# Patient Record
Sex: MALE | Race: WHITE | URBAN - METROPOLITAN AREA
[De-identification: names, ages, dates, MRNs, and addresses within clinical notes are randomized per-mention and may not be internally consistent; named-entity substitution may affect disease eponyms.]

---

## 2024-08-06 ENCOUNTER — SSC ENCOUNTER (OUTPATIENT)
Dept: ADMINISTRATIVE | Facility: OTHER | Age: 79
End: 2024-08-06

## 2024-08-08 ENCOUNTER — OUTPATIENT CASE MANAGEMENT (OUTPATIENT)
Dept: ADMINISTRATIVE | Facility: OTHER | Age: 79
End: 2024-08-08

## 2024-08-30 ENCOUNTER — OUTPATIENT CASE MANAGEMENT (OUTPATIENT)
Dept: ADMINISTRATIVE | Facility: OTHER | Age: 79
End: 2024-08-30

## 2024-08-30 NOTE — LETTER
08/30/2024         Aldair Peña Jr   [address] [place] [address]       Dear Aldair Peña Jr,     I work with [facility]'s Outpatient  Complex Care Management Department. We received a referral to call you to discuss your medical history. These services are free of charge and are offered to [facility] patients who have recently been discharged from any of our facilities or who have medical conditions that may require the skill of a nurse to assist with management.     I am a Registered Nurse who specializes in connecting patients with available medical and financial resources as well as addressing any educational needs that may be indicated.    I attempted to reach you by telephone, but I was unsuccessful. Please call our department so that we can go over some questions with you, regarding your health.    The Outpatient Care Management Department can be reached at [phone], from 8:00AM to 4:30 PM, on Monday thru Friday.           Thanks,        Valentina Berg, RN Case Manager  Outpatient Complex Care Management  [facility]  Business: [phone]   [email or web address]

## 2024-08-30 NOTE — PROGRESS NOTES
8/30/2024    2nd attempt to complete Initial Assessment  for Outpatient Care Management, left message.  Will also send via [facility] [employer] portal pt  -  unable to assess letter.

## 2024-09-03 ENCOUNTER — OUTPATIENT CASE MANAGEMENT (OUTPATIENT)
Dept: ADMINISTRATIVE | Facility: OTHER | Age: 79
End: 2024-09-03

## 2024-09-03 NOTE — PROGRESS NOTES
9/3/2024  OPCM outreach call, 3rd attempt to complete Initial Assessment  for Outpatient Care Management. Pt's wife is requesting a call back due to the Mr. Peña taking a nap. Will attempt tomorrow, 9/4/24.

## 2024-09-03 NOTE — NURSING
I offered to discuss advanced care planning, including how to pick a person who would make decisions for you if you were unable to make them for yourself, called a health care power of . Nargis, the patient's wife states it is already on file in Epic. Verified as of 1/6/2020, the document has been in place. No updates requested.

## 2024-09-04 ENCOUNTER — OUTPATIENT CASE MANAGEMENT (OUTPATIENT)
Dept: ADMINISTRATIVE | Facility: OTHER | Age: 79
End: 2024-09-04

## 2024-09-06 NOTE — PROGRESS NOTES
Outpatient Care Management  Initial Patient Assessment    Patient: Aldair Peña Jr  MRN: [id number]  Date of Service: 09/04/2024  Completed by: Valentina Berg  Referral Date:  8/6/24  Date of Eligibility: 8/6/2024  Program: High Risk  Status: Ongoing  Effective Dates: 9/4/2024 - present  Responsible Staff: Valentina Berg        Reason for Visit   Patient presents with    OPCM Enrollment Call    Nursing Assessment       Brief Summary:    Aldair Peña Jr was referred by High Risk list [facility] pt on Estela for OPCM. Patient qualifies for program based on Estela Risk Score of 1.977 and OCH of 40.9.   Active problem list, medical, surgical and social history reviewed.   Active comorbidities include:Pure hypercholesterolemia, Bladder neck obstruction,Meniere's disease, H/O prostate biopsy, Constipation, Non-recurrent unilateral inguinal hernia without obstruction or gangrene, History of bladder cancer, Paraspinal mass,Plasma cell disorder, Dementia without behavioral disturbance, Hypercalcemia of malignancy, Hematuria, Iron deficiency anemia, Tremor, Acute cystitis without hematuria, other dysphagia, Bladder cancer , MGUS (monoclonal gammopathy of unknown significance), Postoperative confusion, S/P prostatectomy, Benign prostatic hyperplasia without lower urinary tract symptoms, Hematuria  Areas of need identified by patient included chronic pain and nocturia.  Next steps:   Mr. Peña agreed to OPCM RN follow up on or around 9/11/24.  Send Mr. Peña written education about chronic pain and alternatives for pain relief such as heat/ice, position changes, guided imagery, aromatherapy etc..  Send letter to MD notifying of enrollment and pt request to create a plan for pain management.   Aldair is agreeable to reading the written education via portal that will be reviewed next follow up.  He will start to create a plan with his wife about managing his pain at home without  medication.    Disability Status  Is the patient alert and oriented (person, place, time, and situation)?: Alert and oriented x 4  Hearing Difficulty or Deaf: yes  Hearing Management: other (hear aids)  Visual Difficulty or Blind: yes (corrective lenses)  Visual and Hearing Needs Conclusion: The pt uses medical devices for correction of vision and hearing bilat. Aldair baxter had eye exam 3 months ago, hearing aids work fine too.  Vision Management: Other (glassess)  Difficulty Concentrating, Remembering or Making Decisions: yes  Concentration Management: medication  Communication Difficulty: no  Eating/Swallowing Difficulty: no  Walking or Climbing Stairs Difficulty: no  Dressing/Bathing Difficulty: no  Toileting : Independent  Continence : Incontience - Bladder (at night)  Difficulty Managing Errands Independently: yes  Equipment Currently Used at Home: none  ADL Conclusion Statement: Mr. Powers is very active and completes his own ADL's. His wife Nargis handles moset of th eIADL's.  Change in Functional Status Since Onset of Current Illness/Injury: yes (dementia sometimes)        Spiritual Beliefs  Spiritual, Cultural Beliefs, Buddhism Practices, Values that Affect Care: no  Description of Beliefs that Will Affect Care: none         Social Determinants of Health     Financial Resource Strain: Low Risk  (9/4/2024)    Overall Financial Resource Strain (CARDIA)     Difficulty of Paying Living Expenses: Not hard at all   Food Insecurity: No Food Insecurity (9/4/2024)    Hunger Vital Sign     Worried About Running Out of Food in the Last Year: Never true     Ran Out of Food in the Last Year: Never true   Transportation Needs: No Transportation Needs (9/4/2024)    TRANSPORTATION NEEDS     Transportation : No   Physical Activity: Sufficiently Active (9/4/2024)    Exercise Vital Sign     Days of Exercise per Week: 5 days     Minutes of Exercise per Session: 50 min   Stress: Stress Concern Present (9/4/2024)    [employer]  [place] [employer] - Occupational Stress Questionnaire     Feeling of Stress : To some extent   Housing Stability: Low Risk  (9/4/2024)    Housing Stability Vital Sign     Unable to Pay for Housing in the Last Year: No     Homeless in the Last Year: No       Roles and Relationships  Primary Source of Support/Comfort: spouse (Nargis)  Name of Support/Comfort Primary Source: Nargis  Primary Roles/Responsibilities: retired; disabled  Secondary Source of Support/Comfort: sibling(s)  Name of Support/Comfort Secondary Source: Karley      Advance Directives (For Healthcare)  Advance Directive  (If Adv Dir status is received, view document under Adv Dir in header or Chart Review Media tab): Advance Directive currently in Epic.        Patient Reported Insurance  Verified current insurance plan:: Medicare            9/4/2024     3:48 PM   Depression Patient Health Questionnaire   Over the last two weeks how often have you been bothered by little interest or pleasure in doing things Not at all   Over the last two weeks how often have you been bothered by feeling down, depressed or hopeless Not at all   PHQ-2 Total Score 0       Learning Assessment       09/04/2024 1549 [facility] (9/4/2024 - Present)   Created by Valentina Berg -  Status: Complete                 PRIMARY LEARNER     Primary Learner Name:  Mr. Peña AA - 09/04/2024 1549    Relationship:  Patient AA - 09/04/2024 1549    Does the primary learner have any barriers to learning?:  Visual, Hearing, Cognitive AA - 09/04/2024 1549    What is the preferred language of the primary learner?:  English AA - 09/04/2024 1549    Is an  required?:  No AA - 09/04/2024 1549    How does the primary learner prefer to learn new concepts?:  Listening, Reading AA - 09/04/2024 1549    How often do you need to have someone help you read instructions, pamphlets, or written material from your doctor or pharmacy?:  Rarely  -  09/04/2024 1549        CO-LEARNER #1     No question answered        CO-LEARNER #2     No question answered        SPECIAL TOPICS     No question answered        ANSWERED BY:     No question answered        Comments         Edit History       Valentina Berg -    09/04/2024 8363

## 2024-09-11 ENCOUNTER — OUTPATIENT CASE MANAGEMENT (OUTPATIENT)
Dept: ADMINISTRATIVE | Facility: OTHER | Age: 79
End: 2024-09-11

## 2024-09-12 ENCOUNTER — OUTPATIENT CASE MANAGEMENT (OUTPATIENT)
Dept: ADMINISTRATIVE | Facility: OTHER | Age: 79
End: 2024-09-12

## 2024-09-12 NOTE — PROGRESS NOTES
Mr. Peña called office phone. He is asking for a recommendation for a Urologist. He states he has three names he was given and is requesting guidance about a physician that is able to continue his POC he had with Dr. Stewart. Dr. Stewart, his Urologist, is retiring and he is needing someone to follow up with every three months, do quarterly bladder ablations, and follow his hx of bladder cancer. He also states he still hasn't received the AD docs. Apologized about him not receiving yet, and sent through email. Encouraged Mr. Peña to research Urologist in the area, but told him I would look up their profile. He wants 2 specific things in a Urologist, and the ability to do an ablation, and wants them located on the [facility] preferably.     Sent two emails. One with information about Urologist on the [facility], and the other with AD docs attached.

## 2024-09-12 NOTE — PROGRESS NOTES
Outpatient Care Management  Plan of Care Follow Up Visit    Patient: Aldair Peña Jr  MRN: [id number]  Date of Service: 09/11/2024  Completed by: Valentina Berg  Referral Date:     Reason for Visit   Patient presents with    OPCM RN Follow Up Call       Brief Summary: OPCM RN followed up with Mr. Peña today for care plan review. Reviewed education about Chronic Pain that was sent via [employer]. Encouraged proper hydration and rest.    Next Steps:   Send education regarding chronic constipation  and how to prevent/alleviate it due to pt stating it is one of the sources of pain he has   Review OTC pain relievers and discuss, and encourage him to speak with his provider about the best option for when other options aren't working.  Mr Peña agreed to OPCM RN follow up on or around 09/19/2024.

## 2024-09-12 NOTE — PATIENT INSTRUCTIONS
ADVANCE DIRECTIVE FOR HEALTH CARE  (Living Will and Health Care Proxy)  This form may be used in the [facility] to make your wishes known about what medical  treatment or other care you would or would not want if you become too sick to speak for yourself.  You are not required to have an advance directive. If you do have an advance directive, be sure that your  doctor, hospital, family, and friends know you have one and know where it is located.  Section 1. Living Will  I, __________________________, being of sound mind and at least 19 years old, would like to  make the following wishes known. I direct that my family, my doctors and health care workers, and all  others follow the directions I am writing down. I know that at any time I can change my mind about  these directions by tearing up this form and writing a new one. I can also do away with these directions  by tearing them up and by telling someone at least 19 years of age of my wishes and asking him or her  to write them down.  I understand that these directions will only be used if I am not able to speak for myself.  If I become terminally ill or injured:  Terminally ill or injured is when my doctor and another doctor decide that I have a condition that  cannot be cured and that I will likely die in the near future from this condition.  Life sustaining treatment - Life sustaining treatment includes drugs, machines, or medical  procedures that would keep me alive but would not cure me. I know that even if I choose not to have life  sustaining treatment, I will still get medicines and treatments that ease my pain and keep me  comfortable.  Place your initials by either yes or no:  I want to have life sustaining treatment if I am terminally ill or injured. ____ Yes ____ No  Artificially provided food and hydration (Food and water through a tube or an IV) - I understand  that if I am terminally ill or injured I may need to be given food and water  through a tube or an IV to  keep me alive if I can no longer chew or swallow on my own or with someone helping me.  Place your initials by either yes or no:  I want to have food and water provided through a tube or an IV if I am terminally ill or injured.  ____ Yes ____ No  If I Become Permanently Unconscious:  Permanent unconsciousness is when my doctor and another doctor agree that within a reasonable  degree of medical certainty I can no longer think, feel anything, knowingly move, or be aware of being  alive. They believe this condition will last indefinitely without hope for improvement and have watched  me long enough to make that decision. I understand that at least one of these doctors must be qualified to  make such a diagnosis.  Life sustaining treatment - Life sustaining treatment includes drugs, machines, or other medical  procedures that would keep me alive but would not cure me. I know that even if I choose not to have life  sustaining treatment, I will still get medicines and treatments that ease my pain and keep me  comfortable.  Place your initials by either yes or no:  I want to have life-sustaining treatment if I am permanently unconscious. ____ Yes ____ No  Artificially provided food and hydration (Food and water through a tube or an IV) - I understand  that if I become permanently unconscious, I may need to be given food and water through a tube or an  IV to keep me alive if I can no longer chew or swallow on my own or with someone helping me.  Place your initials by either yes or no:  I want to have food and water provided through a tube or an IV if I am permanently unconscious.  ____ Yes ____ No  Other Directions: Please list any other things you want done or not done.  In addition to the directions I have listed on this form, I also want the  following:  __________________________________________________________________________________  __________________________________________________________________________________  __________________________________________________________________________________  __________________________________________________________________________________  If you do not have other directions, place your initials here:  ____ No, I do not have any other directions.  Section 2. If I need someone to speak for me  This form can be used in the [facility] to name a person you would like to make medical  or other decisions for you if you become too sick to speak for yourself. This person is called a health  care proxy. You do not have to name a health care proxy. The directions in this form will be followed  even if you do not name a health care proxy.  Place your initials by only one answer:  _____ I do not want to name a health care proxy. (If you check this answer, go to Section 3)  _____ I do want the person listed below to be my health care proxy. I have talked with this  person about my wishes.  First choice for proxy: ________________________________________  Relationship to me: ___________________________________________  Address: ____________________________________________________  City: ____________________________ State _______ Zip ___________  Day-time phone number: _______________________________________  Night-time phone number: ______________________________________  If this person is not able, not willing, or not available to be my health care proxy, this is my next  choice:  Second choice for proxy: ______________________________________  Relationship to me: ___________________________________________  Address: ____________________________________________________  City: ____________________________ State _______ Zip ___________  Day-time phone number: _______________________________________  Night-time phone  number: ______________________________________  Instructions for Proxy  Place your initials by either yes or no:  I want my health care proxy to make decisions about whether to give me food and water through a tube  or an IV. ____ Yes ____ No  Se  Place your initials by only one of the following:  _____ I want my health care proxy to follow only the directions as listed on this form.  _____ I want my health care proxy to follow my directions as listed on this form and to make  any decisions about things I have not covered in the form.  _____ I want my health care proxy to make the final decision, even though it could mean doing  something different from what I have listed on this form.  Section 3. The things listed on this form are what I want  I understand the following:   If my doctor or hospital does not want to follow the directions I have listed, they must see that I get  to a doctor or hospital who will follow my directions.   If I am pregnant, or if I become pregnant, the choices I have made on this form will not be followed  until after the birth of the baby.   If the time comes for me to stop receiving life sustaining treatment or food and water through a tube  or an IV, I direct that my doctor talk about the good and bad points of doing this, along with my  wishes, with my health care proxy, if I have one, and with the following people:  ____________________________________________________________________  ____________________________________________________________________  Section 4. My signature  Your name: _______________________________________________________  The month, day, and year of your birth: _________________________________  Your signature: ____________________________________________________  Date signed: _______________________________________________________  S  Section 5. Witnesses (need two witnesses to sign)  I am witnessing this form because I believe this person to be of sound  mind. I did not sign the  persons signature, and I am not the health care proxy. I am not related to the person by blood, adoption,  or marriage and not entitled to any part of his or her estate. I am at least 19 years of age and am not  directly responsible for paying for his or her medical care.  Name of first witness: ____________________________________  Signature: _____________________________________________  Date: _________________________________________________  Name of second witness: _________________________________  Signature: _____________________________________________  Date: _________________________________________________  Section 6. Signature of Proxy  I, __________________________, am willing to serve as the health care proxy.  Signature: ________________________________________ Date: _________________________  Signature of Second Choice for Proxy:  I, __________________________, am willing to serve as the health care proxy if the first choice cannot  serve.  Signature: ________________________________________ Date: _________________________  of Proxy

## 2024-09-27 ENCOUNTER — OUTPATIENT CASE MANAGEMENT (OUTPATIENT)
Dept: ADMINISTRATIVE | Facility: OTHER | Age: 79
End: 2024-09-27

## 2024-09-27 NOTE — PROGRESS NOTES
09/24/2024        Dear Aldair Peña Jr,     I am your nurse with [facility] Outpatient Care Management Department. I was unsuccessful in reaching you today, 09/24/2024. At your earliest convenience, I would like to discuss your healthcare progress.      Please contact me at [phone] from 8:00AM to 4:30 PM on Monday thru Friday. Otherwise, I will attempt to reach you next week to follow up.        Thanks,      Valentina Berg RN Case Manager  Outpatient Clinical Care Manager  [facility]  [phone]   [email or web address]

## 2024-09-27 NOTE — PROGRESS NOTES
Outpatient Care Management  Plan of Care Follow Up Visit    Patient: Aldair Peña Jr  MRN: [id number]  Date of Service: 09/27/2024  Completed by: Valentina Berg  Referral Date: 08/06/2024    Reason for Visit   Patient presents with    OPCM RN Follow Up Call     9/27/2024  1st attempt to complete Follow-Up in the past 2 weeks.  for Outpatient Care Management, left message.  Will send via [employer] .

## 2024-09-29 ENCOUNTER — OUTPATIENT CASE MANAGEMENT (OUTPATIENT)
Dept: ADMINISTRATIVE | Facility: OTHER | Age: 79
End: 2024-09-29

## 2024-09-30 NOTE — PATIENT INSTRUCTIONS
Train your bladder  Bladder training is when you urinate on a schedule to reduce leaking. Your health care professional may use your bladder diary (PDF, 481.59 KB)  to suggest you use the bathroom on a regular schedule, called timed voiding. Gradually lengthening the time between trips to the bathroom can help stretch your bladder so it can hold more urine. Record your daily bathroom habits so you and your health care professional can review your diary.  Do pelvic floor muscle exercises  Strong pelvic floor muscles hold in urine better than weak muscles. You can strengthen your pelvic floor muscles by doing Kegel exercises. These exercises involve tightening and relaxing the muscles that control urine flow. Researchers found that women who received pelvic floor muscle training had fewer leaks per day than women who didnt receive training.6 You should not do pelvic floor exercises while youre urinating.  Men can also benefit from pelvic floor muscle exercises. Strengthening these muscles may help a man leak urine less often, especially dribbling after urination.  A health care professional, such as a physical therapist trained in pelvic floor therapy, can help you get the most out of your Kegel exercises by helping you improve your core muscle strength. Your core includes your torso muscles, especially the lower back, pelvic floor muscles, and abdomen. These muscles keep your pelvis lined up with your spine, which helps with good posture and balance. Your physical therapist can show you how to do some exercises during daily activities, such as riding in a car or sitting at a desk.  You dont need special equipment for Kegel exercises. However, if you are unsure whether you are doing the exercises correctly, you can learn how to perform Kegel exercises properly by using biofeedback, electrical stimulation, or both. Biofeedback uses special sensors to measure muscle contractions that control urination.  Control your  urge to urinate  You may be able to control, or suppress, the strong urge to urinate, which is called urge or urgency suppression. With this type of bladder training, you can worry less about finding a bathroom in a hurry. Some people distract themselves to take their minds off needing to urinate. Other people find that long, relaxing breaths or holding still can help. Doing pelvic floor exercises to strengthen your pelvic floor also can help control the urge to urinate. Quick, strong squeezes of the pelvic floor muscles can help suppress urgency when it occurs, which may help you get to the toilet before you leak.  How can my health care professional treat my bladder control problem?    If lifestyle changes dont improve your urinary incontinence, speak with your health care professional about other options.  If lifestyle changes arent working for you, your health care professional may prescribe medicine, a medical device, a bulking agent, or--as a last resort--surgery to help treat UI.  Medicines  Urgency incontinence may be treated with one of the following medicines as a pill, liquid, or patch to relax your bladder  anticholinergics  beta-3 agonists  tricyclic antidepressants  Health care professionals may use botulinum toxin A NIH external link, also known as Botox, to treat UI when other medicines or self-care treatments dont work. Botox may be injected into your bladder. Injecting Botox into the bladder relaxes it, which makes more room for urine and lowers the chances of urine leaks.  In addition to anticholinergics, men with UI who have an enlarged prostate may also be prescribed  alpha-blockers  5-alpha reductase inhibitors  phosphodiesterase-5 inhibitors      Catheter  Individuals with overflow incontinence may need to use a catheter to empty their bladder. Your health care professional will teach you how to use a catheter. Proper hygiene is important for catheter use and to avoid a bladder  infection.  Bulking agents  Gel or paste can be injected near your urinary sphincter to treat stress incontinence. The injected material bulks up the area around the urethra. The extra thickness helps close your bladder opening so you leak less.  Electrical nerve stimulation  If medicines and lifestyle changes dont help your UI, your health care professional may suggest electrical nerve stimulation for urgency incontinence and other symptoms. Electrical nerve stimulation changes your bladders reflexes, using mild pulses of electricity to stimulate the nerves that control the bladder and sphincter muscles.  Surgery  Artificial urinary sphincter  Stress incontinence in men can be treated by implanting an artificial urinary sphincter--a device that helps keep the urethra closed to prevent leaks.  Surgery to remove a blockage  Overflow incontinence caused by a blockage or a narrowed urethra can be treated with surgery to remove the blockage.  Bladder outlet obstruction may occur in men who have an enlarged prostate. When other treatments dont work well enough, your health care professional may suggest surgery, including minimally invasive surgery, to remove enlarged prostate tissue or to widen the urethra.  Bladder enlargement, or bladder augmentation  If UI is caused by nerve damage, your health care professional may suggest surgery to make your bladder larger. This procedure may allow your bladder to store more urine but can make the bladder more difficult to empty, which may require you to use a catheter to empty your bladder.  How can I cope with bladder control problems?  Protective products  Even after treatment, you may still leak urine from time to time. Certain products can help you cope with urine leaks  Absorbent, washable incontinence underwear. You can use underwear lined with special fabric that absorbs urine.  Waterproof underwear. Waterproof underwear can protect your clothes from getting wet.  Adult  incontinence briefs. You can wear disposable incontinence briefs that act like diapers to keep your clothes dry.  Pads. You can wear disposable pads in your underwear to absorb leaking urine.  Large disposable pads. You can use large pads to protect chairs and beds from urine.  Special skin  and creams. Special skin  and creams may keep the skin around the urethra from getting irritated. Creams can help block urine from your skin.  Urine deodorizing tablets. Talk with your health care professional about whether taking urine deodorizing tablets by mouth can make your urine smell less strong.  External catheters. An external catheter, used by men, can collect urine from a mans body and drain it into a bag that is attached to the thigh with a strap.  Emotional support  Bladder control problems are common, yet many people feel too embarrassed to talk about them. At the very least, talk with your health care professional about your bladder problems. Your health care professional can help you connect with a support group for people with similar problems.  Consider speaking with your family and friends about your UI. Your family and friends may make it easier for you to manage UI. In sharing your struggle, you may find that other people in your life have bladder problems as well.

## 2024-10-03 ENCOUNTER — OUTPATIENT CASE MANAGEMENT (OUTPATIENT)
Dept: ADMINISTRATIVE | Facility: OTHER | Age: 79
End: 2024-10-03

## 2024-10-03 NOTE — PROGRESS NOTES
On 9/27/24, called pt for f/u visit, but wasn't able to get ahold of him, and left voicemail. The pt called and left a voicemail on Sunday, 9/29, and while I was away from the office at a clinical site on 10/1. In the message he is stating that he needs advice and if I could please call him back ASAP. Returned his call today, and wasn't able to get ahold of him again. It was my  2nd attempt to complete Follow-Up  for Outpatient Care Management, left message.  Sent message via [facility] as well. The pt did say he would be out of town because last week wife was diagnosed with a serious condition, and they will be traveling for treatment. Will attempt another call next week.

## 2024-10-07 ENCOUNTER — OUTPATIENT CASE MANAGEMENT (OUTPATIENT)
Dept: ADMINISTRATIVE | Facility: OTHER | Age: 79
End: 2024-10-07

## 2024-10-07 NOTE — PROGRESS NOTES
Mr. Peña contacted by [employer] message in [facility]. Notified me he is out of town with his wife whom is seeking medical treatment for a new, serious diagnosis. Instructed him in the message to contact me next week when he comes back.         You completed this message on 10/7/2024. The information that appears might not be up to date.     Received your message  Received: 3 days ago  Mariana Han Berg RN  Phone Number: [phone]     I could not reach you several days ago by phone or online since the send button on [employer] was dead.  I'll try to contact you next week, since the weekend is here.  Nargis's appt at [facility] is Tuesday.          Received your message  (Newest Message First)  View All Conversations on this Encounter  You  Han ROLON Mariana Connor2 hours ago (3:08 PM)       Han, I am sorry you weren't able to get a hold of me before leaving to seek care for Nargis. You two are in my thoughts, and I am sending good vibes your way to pass on to her. Call me when you get back into town. We will follow up then. Let me know if I can do anything for you two.         Truly,           Valentina Berg RN  Outpatient Care Management Nurse  [facility]  [phone]  [email or web address]    This [employer] message has not been read.     Han Peña Jr.  You3 days ago       I could not reach you several days ago by phone or online since the send button on [employer] was dead.  I'll try to contact you next week, since the weekend is here.  Nargis's appt at [facility] is Tuesday.        You  Han Peña Jr.4 days ago       Shmuel Peña, I do apologize. You left a message on Tuesday morning asking for me to call you about advise needed. I was doing my quarterly site visits with all of my doctors at their locations, and was out of the office. I hope everything is ok, and I will be keeping you and your wife in my thoughts as you two navigate  her treatment options and treatments. If Ms. Barillas would like, we can also enroll her into the program, so I can start putting things in place for her too, and keeping a closer eye on her. We can also work on resources in the community for both of you if needed.      I look forward to hearing back from you. I did want to ask you a few questions about the Urology situation and the appt you did make. Also, I wanted to talk to you about certain products available that may be helpful to you.        Best Regards,           Valentina Berg, RN  Outpatient Care Management Nurse  [facility]  [phone]  [email or web address]

## 2024-10-18 ENCOUNTER — OUTPATIENT CASE MANAGEMENT (OUTPATIENT)
Dept: ADMINISTRATIVE | Facility: OTHER | Age: 79
End: 2024-10-18

## 2024-10-18 NOTE — PROGRESS NOTES
Outpatient Care Management  Plan of Care Follow Up Visit    Patient: Aldair Peña Jr  MRN: [id number]  Date of Service: 10/18/2024  Completed by: Valentina Berg  Referral Date:     Reason for Visit   Patient presents with    OPCM RN Follow Up Call     10/18/2024  1st attempt to complete Follow-Up  for Outpatient Care Management, left message.          Brief Summary: Call placed to pt, left message on voicemail. He call right after assigning a missed visit. States he just got back home a few days ago from [facility]. Asking if I will talk to his wife about her new diagnosis and side effects of treatment, and various other topics about retinal melanoma.  Next Steps:   Han agreed to OPCM RN follow up on or around 10/23/24. He is requesting I speak with his wife about enrolling in the program.  Send information about the external catheter also called a wearable urinal bag.  Han agrees to start scheduling bathroom visits every hour or two, to help alleviate the sudden urgency of needing to urinate.Han agrees to start scheduling bathroom visits every hour or two, to help alleviate the sudden urgency of needing to urinate.

## 2024-10-18 NOTE — PROGRESS NOTES
10/18/2024  1st attempt to complete Follow-Up  for Outpatient Care Management, left message.  Returned call from message left on Wednesday.

## 2024-11-14 ENCOUNTER — OUTPATIENT CASE MANAGEMENT (OUTPATIENT)
Dept: ADMINISTRATIVE | Facility: OTHER | Age: 79
End: 2024-11-14

## 2024-11-14 NOTE — PROGRESS NOTES
Outpatient Care Management  Plan of Care Follow Up Visit    Patient: Aldair Peña Jr  MRN: [id number]  Date of Service: 11/14/2024  Completed by: Valentina Berg  Referral Date:     Reason for Visit   Patient presents with    OPCM RN Follow Up Call       Brief Summary:   Mr. 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 He has decided to forgo treatment for quality of life reasons.  Next Steps:  Send EDU about the difference between palliative care and hospice.  Message Dr Medina's office and notify that the patient is wanting to start palliative care.  Mr. Peña agreed to OPCM RN follow up on or around 11/18/24.  Mr Peña is agreeable to reviewing the information about Palliative Care and Hospice over the weekend, and on f/u Monday, we will discuss further. Pt is requesting to start Palliative Care.

## 2024-11-18 ENCOUNTER — OUTPATIENT CASE MANAGEMENT (OUTPATIENT)
Dept: ADMINISTRATIVE | Facility: OTHER | Age: 79
End: 2024-11-18

## 2024-11-18 NOTE — PROGRESS NOTES
Friday, 11/15/24, sent a secure InBox message to DR Lara's office about the patient possibly wanting to start the Palliative Care program. Provided him some written EDU about Palliative Care [facility]. The patient reviewed the information over the weekend and discussed with his wife. 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

## 2024-11-21 ENCOUNTER — OUTPATIENT CASE MANAGEMENT (OUTPATIENT)
Dept: ADMINISTRATIVE | Facility: OTHER | Age: 79
End: 2024-11-21

## 2024-11-21 NOTE — PROGRESS NOTES
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 OPCM call to f/u about appt. 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 Scheduled call tomorrow for 10-11.

## 2024-11-22 ENCOUNTER — OUTPATIENT CASE MANAGEMENT (OUTPATIENT)
Dept: ADMINISTRATIVE | Facility: OTHER | Age: 79
End: 2024-11-22

## 2025-01-13 ENCOUNTER — OUTPATIENT CASE MANAGEMENT (OUTPATIENT)
Dept: ADMINISTRATIVE | Facility: OTHER | Age: 80
End: 2025-01-13

## 2025-01-13 NOTE — LETTER
Aladir Peña Jr  [address] [place] [id number]      Dear Aldair Peña Jr,     I am your nurse with [facility]s Outpatient Care Management Department. I was unsuccessful in reaching you today. At your earliest convenience, I would like to discuss your healthcare progress.      Please contact me at [phone] from 8:00AM to 4:30 PM on Monday thru Friday.     As you know, [facility] is a program offered to you through [facility] where a nurse is available 24/7 to answer questions or provide medical advice, their number is [phone].    Thanks,      CARLOTTA ROSAS, RN  Outpatient Care Management

## 2025-01-21 ENCOUNTER — OUTPATIENT CASE MANAGEMENT (OUTPATIENT)
Dept: ADMINISTRATIVE | Facility: OTHER | Age: 80
End: 2025-01-21